# Patient Record
Sex: MALE | Race: BLACK OR AFRICAN AMERICAN | NOT HISPANIC OR LATINO | URBAN - METROPOLITAN AREA
[De-identification: names, ages, dates, MRNs, and addresses within clinical notes are randomized per-mention and may not be internally consistent; named-entity substitution may affect disease eponyms.]

---

## 2019-02-01 ENCOUNTER — OFFICE VISIT CONVERTED (OUTPATIENT)
Dept: FAMILY MEDICINE CLINIC | Facility: CLINIC | Age: 59
End: 2019-02-01
Attending: NURSE PRACTITIONER

## 2019-04-23 ENCOUNTER — HOSPITAL ENCOUNTER (OUTPATIENT)
Dept: GASTROENTEROLOGY | Facility: HOSPITAL | Age: 59
Setting detail: HOSPITAL OUTPATIENT SURGERY
Discharge: HOME | End: 2019-04-23
Attending: INTERNAL MEDICINE

## 2019-04-23 LAB — GLUCOSE BLD-MCNC: 121 MG/DL (ref 70–99)

## 2019-05-06 ENCOUNTER — OFFICE VISIT CONVERTED (OUTPATIENT)
Dept: FAMILY MEDICINE CLINIC | Facility: CLINIC | Age: 59
End: 2019-05-06
Attending: NURSE PRACTITIONER

## 2019-05-10 ENCOUNTER — HOSPITAL ENCOUNTER (OUTPATIENT)
Dept: OTHER | Facility: HOSPITAL | Age: 59
Discharge: HOME | End: 2019-05-10
Attending: NURSE PRACTITIONER

## 2019-05-10 LAB
ALBUMIN SERPL-MCNC: 4.9 G/DL (ref 3.5–5)
ALBUMIN/GLOB SERPL: 1.5 {RATIO} (ref 1.4–2.6)
ALP SERPL-CCNC: 91 U/L (ref 56–119)
ALT SERPL-CCNC: 99 U/L (ref 10–40)
ANION GAP SERPL CALC-SCNC: 20 MMOL/L (ref 8–19)
AST SERPL-CCNC: 81 U/L (ref 15–50)
BASOPHILS # BLD AUTO: 0.05 10*3/UL (ref 0–0.2)
BASOPHILS NFR BLD AUTO: 0.8 % (ref 0–3)
BILIRUB SERPL-MCNC: 0.47 MG/DL (ref 0.2–1.3)
BUN SERPL-MCNC: 13 MG/DL (ref 5–25)
BUN/CREAT SERPL: 11 {RATIO} (ref 6–20)
CALCIUM SERPL-MCNC: 10.1 MG/DL (ref 8.7–10.4)
CHLORIDE SERPL-SCNC: 98 MMOL/L (ref 99–111)
CHOLEST SERPL-MCNC: 113 MG/DL (ref 107–200)
CHOLEST/HDLC SERPL: 3.3 {RATIO} (ref 3–6)
CONV ABS IMM GRAN: 0.02 10*3/UL (ref 0–0.2)
CONV CO2: 24 MMOL/L (ref 22–32)
CONV CREATININE URINE, RANDOM: 122.6 MG/DL (ref 10–300)
CONV IMMATURE GRAN: 0.3 % (ref 0–1.8)
CONV MICROALBUM.,U,RANDOM: 56 MG/L (ref 0–20)
CONV TOTAL PROTEIN: 8.1 G/DL (ref 6.3–8.2)
CREAT UR-MCNC: 1.15 MG/DL (ref 0.7–1.2)
DEPRECATED RDW RBC AUTO: 45.7 FL (ref 35.1–43.9)
EOSINOPHIL # BLD AUTO: 0.33 10*3/UL (ref 0–0.7)
EOSINOPHIL # BLD AUTO: 5 % (ref 0–7)
ERYTHROCYTE [DISTWIDTH] IN BLOOD BY AUTOMATED COUNT: 13.2 % (ref 11.6–14.4)
EST. AVERAGE GLUCOSE BLD GHB EST-MCNC: 160 MG/DL
GFR SERPLBLD BASED ON 1.73 SQ M-ARVRAT: >60 ML/MIN/{1.73_M2}
GLOBULIN UR ELPH-MCNC: 3.2 G/DL (ref 2–3.5)
GLUCOSE SERPL-MCNC: 170 MG/DL (ref 70–99)
HBA1C MFR BLD: 12.4 G/DL (ref 14–18)
HBA1C MFR BLD: 7.2 % (ref 3.5–5.7)
HCT VFR BLD AUTO: 40.9 % (ref 42–52)
HDLC SERPL-MCNC: 34 MG/DL (ref 40–60)
LDLC SERPL CALC-MCNC: 58 MG/DL (ref 70–100)
LITHIUM SERPL-SCNC: 0.5 MEQ/L (ref 0.5–1.5)
LYMPHOCYTES # BLD AUTO: 1.73 10*3/UL (ref 1–5)
MCH RBC QN AUTO: 28.9 PG (ref 27–31)
MCHC RBC AUTO-ENTMCNC: 30.3 G/DL (ref 33–37)
MCV RBC AUTO: 95.3 FL (ref 80–96)
MICROALBUMIN/CREAT UR: 45.7 MG/G{CRE} (ref 0–25)
MONOCYTES # BLD AUTO: 0.57 10*3/UL (ref 0.2–1.2)
MONOCYTES NFR BLD AUTO: 8.7 % (ref 3–10)
NEUTROPHILS # BLD AUTO: 3.84 10*3/UL (ref 2–8)
NEUTROPHILS NFR BLD AUTO: 58.7 % (ref 30–85)
NRBC CBCN: 0 % (ref 0–0.7)
OSMOLALITY SERPL CALC.SUM OF ELEC: 290 MOSM/KG (ref 273–304)
PLATELET # BLD AUTO: 226 10*3/UL (ref 130–400)
PMV BLD AUTO: 9.8 FL (ref 9.4–12.4)
POTASSIUM SERPL-SCNC: 4.2 MMOL/L (ref 3.5–5.3)
RBC # BLD AUTO: 4.29 10*6/UL (ref 4.7–6.1)
SODIUM SERPL-SCNC: 138 MMOL/L (ref 135–147)
T4 FREE SERPL-MCNC: 1.3 NG/DL (ref 0.9–1.8)
TRIGL SERPL-MCNC: 106 MG/DL (ref 40–150)
TSH SERPL-ACNC: 1.92 M[IU]/L (ref 0.27–4.2)
VARIANT LYMPHS NFR BLD MANUAL: 26.5 % (ref 20–45)
VLDLC SERPL-MCNC: 21 MG/DL (ref 5–37)
WBC # BLD AUTO: 6.54 10*3/UL (ref 4.8–10.8)

## 2019-05-17 ENCOUNTER — HOSPITAL ENCOUNTER (OUTPATIENT)
Dept: FAMILY MEDICINE CLINIC | Facility: CLINIC | Age: 59
Discharge: HOME | End: 2019-05-17
Attending: NURSE PRACTITIONER

## 2019-05-17 LAB
FOLATE SERPL-MCNC: >20 NG/ML (ref 4.8–20)
VIT B12 SERPL-MCNC: >2000 PG/ML (ref 211–911)

## 2019-05-18 LAB
IRON SATN MFR SERPL: 18 % (ref 20–55)
IRON SERPL-MCNC: 90 UG/DL (ref 70–180)
TIBC SERPL-MCNC: 488 UG/DL (ref 245–450)
TRANSFERRIN SERPL-MCNC: 341 MG/DL (ref 215–365)

## 2019-05-19 LAB
CONV HEPATITIS B SURFACE AG W CONFIRMATION RE: NEGATIVE
HBV CORE IGM SERPL QL IA: NEGATIVE
HBV E AG SERPL QL IA: NEGATIVE
HBV SURFACE AB SER QL: REACTIVE

## 2019-05-21 LAB — HBV E AB SERPL QL IA: POSITIVE

## 2019-07-15 ENCOUNTER — HOSPITAL ENCOUNTER (OUTPATIENT)
Dept: FAMILY MEDICINE CLINIC | Facility: CLINIC | Age: 59
Discharge: HOME | End: 2019-07-15
Attending: NURSE PRACTITIONER

## 2019-07-15 LAB
25(OH)D3 SERPL-MCNC: 37.4 NG/ML (ref 30–100)
ALBUMIN SERPL-MCNC: 4.7 G/DL (ref 3.5–5)
ALBUMIN/GLOB SERPL: 1.7 {RATIO} (ref 1.4–2.6)
ALP SERPL-CCNC: 89 U/L (ref 56–119)
ALT SERPL-CCNC: 92 U/L (ref 10–40)
ANION GAP SERPL CALC-SCNC: 23 MMOL/L (ref 8–19)
AST SERPL-CCNC: 55 U/L (ref 15–50)
BASOPHILS # BLD AUTO: 0.04 10*3/UL (ref 0–0.2)
BASOPHILS NFR BLD AUTO: 0.6 % (ref 0–3)
BILIRUB SERPL-MCNC: 0.61 MG/DL (ref 0.2–1.3)
BUN SERPL-MCNC: 10 MG/DL (ref 5–25)
BUN/CREAT SERPL: 9 {RATIO} (ref 6–20)
CALCIUM SERPL-MCNC: 9.7 MG/DL (ref 8.7–10.4)
CHLORIDE SERPL-SCNC: 100 MMOL/L (ref 99–111)
CHOLEST SERPL-MCNC: 115 MG/DL (ref 107–200)
CHOLEST/HDLC SERPL: 3.1 {RATIO} (ref 3–6)
CONV ABS IMM GRAN: 0.01 10*3/UL (ref 0–0.2)
CONV CO2: 21 MMOL/L (ref 22–32)
CONV IMMATURE GRAN: 0.2 % (ref 0–1.8)
CONV TOTAL PROTEIN: 7.4 G/DL (ref 6.3–8.2)
CREAT UR-MCNC: 1.11 MG/DL (ref 0.7–1.2)
DEPRECATED RDW RBC AUTO: 47.8 FL (ref 35.1–43.9)
EOSINOPHIL # BLD AUTO: 0.3 10*3/UL (ref 0–0.7)
EOSINOPHIL # BLD AUTO: 4.8 % (ref 0–7)
ERYTHROCYTE [DISTWIDTH] IN BLOOD BY AUTOMATED COUNT: 13.7 % (ref 11.6–14.4)
EST. AVERAGE GLUCOSE BLD GHB EST-MCNC: 163 MG/DL
GFR SERPLBLD BASED ON 1.73 SQ M-ARVRAT: >60 ML/MIN/{1.73_M2}
GLOBULIN UR ELPH-MCNC: 2.7 G/DL (ref 2–3.5)
GLUCOSE SERPL-MCNC: 167 MG/DL (ref 70–99)
HBA1C MFR BLD: 12 G/DL (ref 14–18)
HBA1C MFR BLD: 7.3 % (ref 3.5–5.7)
HCT VFR BLD AUTO: 39.1 % (ref 42–52)
HDLC SERPL-MCNC: 37 MG/DL (ref 40–60)
LDLC SERPL CALC-MCNC: 58 MG/DL (ref 70–100)
LYMPHOCYTES # BLD AUTO: 2.05 10*3/UL (ref 1–5)
MCH RBC QN AUTO: 29.1 PG (ref 27–31)
MCHC RBC AUTO-ENTMCNC: 30.7 G/DL (ref 33–37)
MCV RBC AUTO: 94.9 FL (ref 80–96)
MONOCYTES # BLD AUTO: 0.52 10*3/UL (ref 0.2–1.2)
MONOCYTES NFR BLD AUTO: 8.3 % (ref 3–10)
NEUTROPHILS # BLD AUTO: 3.33 10*3/UL (ref 2–8)
NEUTROPHILS NFR BLD AUTO: 53.3 % (ref 30–85)
NRBC CBCN: 0 % (ref 0–0.7)
OSMOLALITY SERPL CALC.SUM OF ELEC: 293 MOSM/KG (ref 273–304)
PLATELET # BLD AUTO: 190 10*3/UL (ref 130–400)
PMV BLD AUTO: 10.6 FL (ref 9.4–12.4)
POTASSIUM SERPL-SCNC: 3.9 MMOL/L (ref 3.5–5.3)
PSA SERPL-MCNC: 0.52 NG/ML (ref 0–4)
RBC # BLD AUTO: 4.12 10*6/UL (ref 4.7–6.1)
SODIUM SERPL-SCNC: 140 MMOL/L (ref 135–147)
TRIGL SERPL-MCNC: 98 MG/DL (ref 40–150)
VARIANT LYMPHS NFR BLD MANUAL: 32.8 % (ref 20–45)
VLDLC SERPL-MCNC: 20 MG/DL (ref 5–37)
WBC # BLD AUTO: 6.25 10*3/UL (ref 4.8–10.8)

## 2019-07-16 LAB — TSH SERPL-ACNC: 2.59 M[IU]/L (ref 0.27–4.2)

## 2019-07-22 ENCOUNTER — HOSPITAL ENCOUNTER (OUTPATIENT)
Dept: FAMILY MEDICINE CLINIC | Facility: CLINIC | Age: 59
Discharge: HOME | End: 2019-07-22
Attending: NURSE PRACTITIONER

## 2019-07-22 ENCOUNTER — OFFICE VISIT CONVERTED (OUTPATIENT)
Dept: FAMILY MEDICINE CLINIC | Facility: CLINIC | Age: 59
End: 2019-07-22
Attending: NURSE PRACTITIONER

## 2019-07-22 ENCOUNTER — CONVERSION ENCOUNTER (OUTPATIENT)
Dept: FAMILY MEDICINE CLINIC | Facility: CLINIC | Age: 59
End: 2019-07-22

## 2019-07-22 LAB
BASOPHILS # BLD AUTO: 0.05 10*3/UL (ref 0–0.2)
BASOPHILS NFR BLD AUTO: 0.7 % (ref 0–3)
CONV ABS IMM GRAN: 0.02 10*3/UL (ref 0–0.2)
CONV IMMATURE GRAN: 0.3 % (ref 0–1.8)
DEPRECATED RDW RBC AUTO: 49.5 FL (ref 35.1–43.9)
EOSINOPHIL # BLD AUTO: 0.29 10*3/UL (ref 0–0.7)
EOSINOPHIL # BLD AUTO: 4.3 % (ref 0–7)
ERYTHROCYTE [DISTWIDTH] IN BLOOD BY AUTOMATED COUNT: 14 % (ref 11.6–14.4)
FOLATE SERPL-MCNC: >20 NG/ML (ref 4.8–20)
HBA1C MFR BLD: 12.3 G/DL (ref 14–18)
HCT VFR BLD AUTO: 40.8 % (ref 42–52)
IRON SATN MFR SERPL: 16 % (ref 20–55)
IRON SERPL-MCNC: 83 UG/DL (ref 70–180)
LYMPHOCYTES # BLD AUTO: 1.92 10*3/UL (ref 1–5)
MCH RBC QN AUTO: 29.1 PG (ref 27–31)
MCHC RBC AUTO-ENTMCNC: 30.1 G/DL (ref 33–37)
MCV RBC AUTO: 96.5 FL (ref 80–96)
MONOCYTES # BLD AUTO: 0.51 10*3/UL (ref 0.2–1.2)
MONOCYTES NFR BLD AUTO: 7.6 % (ref 3–10)
NEUTROPHILS # BLD AUTO: 3.94 10*3/UL (ref 2–8)
NEUTROPHILS NFR BLD AUTO: 58.6 % (ref 30–85)
NRBC CBCN: 0 % (ref 0–0.7)
PLATELET # BLD AUTO: 202 10*3/UL (ref 130–400)
PMV BLD AUTO: 11 FL (ref 9.4–12.4)
RBC # BLD AUTO: 4.23 10*6/UL (ref 4.7–6.1)
TIBC SERPL-MCNC: 505 UG/DL (ref 245–450)
TRANSFERRIN SERPL-MCNC: 353 MG/DL (ref 215–365)
VARIANT LYMPHS NFR BLD MANUAL: 28.5 % (ref 20–45)
VIT B12 SERPL-MCNC: 1728 PG/ML (ref 211–911)
WBC # BLD AUTO: 6.73 10*3/UL (ref 4.8–10.8)

## 2019-10-02 ENCOUNTER — OFFICE VISIT CONVERTED (OUTPATIENT)
Dept: FAMILY MEDICINE CLINIC | Facility: CLINIC | Age: 59
End: 2019-10-02
Attending: NURSE PRACTITIONER

## 2019-11-08 ENCOUNTER — OFFICE VISIT CONVERTED (OUTPATIENT)
Dept: FAMILY MEDICINE CLINIC | Facility: CLINIC | Age: 59
End: 2019-11-08
Attending: NURSE PRACTITIONER

## 2019-11-08 ENCOUNTER — HOSPITAL ENCOUNTER (OUTPATIENT)
Dept: FAMILY MEDICINE CLINIC | Facility: CLINIC | Age: 59
Discharge: HOME | End: 2019-11-08
Attending: NURSE PRACTITIONER

## 2019-11-08 LAB
BASOPHILS # BLD AUTO: 0.05 10*3/UL (ref 0–0.2)
BASOPHILS NFR BLD AUTO: 0.7 % (ref 0–3)
CONV ABS IMM GRAN: 0.02 10*3/UL (ref 0–0.2)
CONV IMMATURE GRAN: 0.3 % (ref 0–1.8)
DEPRECATED RDW RBC AUTO: 48.9 FL (ref 35.1–43.9)
EOSINOPHIL # BLD AUTO: 0.38 10*3/UL (ref 0–0.7)
EOSINOPHIL # BLD AUTO: 5.1 % (ref 0–7)
ERYTHROCYTE [DISTWIDTH] IN BLOOD BY AUTOMATED COUNT: 13.7 % (ref 11.6–14.4)
EST. AVERAGE GLUCOSE BLD GHB EST-MCNC: 146 MG/DL
HBA1C MFR BLD: 6.7 % (ref 3.5–5.7)
HCT VFR BLD AUTO: 41.9 % (ref 42–52)
HGB BLD-MCNC: 13 G/DL (ref 14–18)
LYMPHOCYTES # BLD AUTO: 2.12 10*3/UL (ref 1–5)
LYMPHOCYTES NFR BLD AUTO: 28.4 % (ref 20–45)
MCH RBC QN AUTO: 30 PG (ref 27–31)
MCHC RBC AUTO-ENTMCNC: 31 G/DL (ref 33–37)
MCV RBC AUTO: 96.5 FL (ref 80–96)
MONOCYTES # BLD AUTO: 0.56 10*3/UL (ref 0.2–1.2)
MONOCYTES NFR BLD AUTO: 7.5 % (ref 3–10)
NEUTROPHILS # BLD AUTO: 4.34 10*3/UL (ref 2–8)
NEUTROPHILS NFR BLD AUTO: 58 % (ref 30–85)
NRBC CBCN: 0 % (ref 0–0.7)
PLATELET # BLD AUTO: 192 10*3/UL (ref 130–400)
PMV BLD AUTO: 11.1 FL (ref 9.4–12.4)
RBC # BLD AUTO: 4.34 10*6/UL (ref 4.7–6.1)
WBC # BLD AUTO: 7.47 10*3/UL (ref 4.8–10.8)

## 2019-11-09 LAB
ALBUMIN SERPL-MCNC: 5 G/DL (ref 3.5–5)
ALBUMIN/GLOB SERPL: 1.9 {RATIO} (ref 1.4–2.6)
ALP SERPL-CCNC: 69 U/L (ref 56–119)
ALT SERPL-CCNC: 84 U/L (ref 10–40)
ANION GAP SERPL CALC-SCNC: 21 MMOL/L (ref 8–19)
AST SERPL-CCNC: 54 U/L (ref 15–50)
BILIRUB SERPL-MCNC: 0.57 MG/DL (ref 0.2–1.3)
BUN SERPL-MCNC: 11 MG/DL (ref 5–25)
BUN/CREAT SERPL: 10 {RATIO} (ref 6–20)
CALCIUM SERPL-MCNC: 10.5 MG/DL (ref 8.7–10.4)
CHLORIDE SERPL-SCNC: 99 MMOL/L (ref 99–111)
CONV CO2: 23 MMOL/L (ref 22–32)
CONV TOTAL PROTEIN: 7.6 G/DL (ref 6.3–8.2)
CREAT UR-MCNC: 1.08 MG/DL (ref 0.7–1.2)
GFR SERPLBLD BASED ON 1.73 SQ M-ARVRAT: >60 ML/MIN/{1.73_M2}
GLOBULIN UR ELPH-MCNC: 2.6 G/DL (ref 2–3.5)
GLUCOSE SERPL-MCNC: 123 MG/DL (ref 70–99)
IRON SATN MFR SERPL: 22 % (ref 20–55)
IRON SERPL-MCNC: 105 UG/DL (ref 70–180)
LITHIUM SERPL-SCNC: 0.6 MEQ/L (ref 0.5–1.5)
OSMOLALITY SERPL CALC.SUM OF ELEC: 289 MOSM/KG (ref 273–304)
POTASSIUM SERPL-SCNC: 4.4 MMOL/L (ref 3.5–5.3)
SODIUM SERPL-SCNC: 139 MMOL/L (ref 135–147)
T4 FREE SERPL-MCNC: 1.2 NG/DL (ref 0.9–1.8)
TIBC SERPL-MCNC: 476 UG/DL (ref 245–450)
TRANSFERRIN SERPL-MCNC: 333 MG/DL (ref 215–365)
TSH SERPL-ACNC: 1.99 M[IU]/L (ref 0.27–4.2)

## 2020-01-15 ENCOUNTER — HOSPITAL ENCOUNTER (OUTPATIENT)
Dept: FAMILY MEDICINE CLINIC | Facility: CLINIC | Age: 60
Discharge: HOME | End: 2020-01-15
Attending: NURSE PRACTITIONER

## 2020-01-15 ENCOUNTER — CONVERSION ENCOUNTER (OUTPATIENT)
Dept: FAMILY MEDICINE CLINIC | Facility: CLINIC | Age: 60
End: 2020-01-15

## 2020-01-15 ENCOUNTER — OFFICE VISIT CONVERTED (OUTPATIENT)
Dept: FAMILY MEDICINE CLINIC | Facility: CLINIC | Age: 60
End: 2020-01-15
Attending: NURSE PRACTITIONER

## 2020-01-15 LAB
ALBUMIN SERPL-MCNC: 4.8 G/DL (ref 3.5–5)
ALBUMIN/GLOB SERPL: 1.8 {RATIO} (ref 1.4–2.6)
ALP SERPL-CCNC: 68 U/L (ref 56–119)
ALT SERPL-CCNC: 66 U/L (ref 10–40)
ANION GAP SERPL CALC-SCNC: 18 MMOL/L (ref 8–19)
AST SERPL-CCNC: 43 U/L (ref 15–50)
BASOPHILS # BLD AUTO: 0.05 10*3/UL (ref 0–0.2)
BASOPHILS NFR BLD AUTO: 0.6 % (ref 0–3)
BILIRUB SERPL-MCNC: 0.68 MG/DL (ref 0.2–1.3)
BUN SERPL-MCNC: 14 MG/DL (ref 5–25)
BUN/CREAT SERPL: 12 {RATIO} (ref 6–20)
CALCIUM SERPL-MCNC: 10.2 MG/DL (ref 8.7–10.4)
CHLORIDE SERPL-SCNC: 104 MMOL/L (ref 99–111)
CHOLEST SERPL-MCNC: 112 MG/DL (ref 107–200)
CHOLEST/HDLC SERPL: 3.6 {RATIO} (ref 3–6)
CONV ABS IMM GRAN: 0.02 10*3/UL (ref 0–0.2)
CONV CO2: 23 MMOL/L (ref 22–32)
CONV IMMATURE GRAN: 0.2 % (ref 0–1.8)
CONV TOTAL PROTEIN: 7.4 G/DL (ref 6.3–8.2)
CREAT UR-MCNC: 1.13 MG/DL (ref 0.7–1.2)
DEPRECATED RDW RBC AUTO: 49.5 FL (ref 35.1–43.9)
EOSINOPHIL # BLD AUTO: 0.37 10*3/UL (ref 0–0.7)
EOSINOPHIL # BLD AUTO: 4.6 % (ref 0–7)
ERYTHROCYTE [DISTWIDTH] IN BLOOD BY AUTOMATED COUNT: 13.8 % (ref 11.6–14.4)
EST. AVERAGE GLUCOSE BLD GHB EST-MCNC: 143 MG/DL
FOLATE SERPL-MCNC: >20 NG/ML (ref 4.8–20)
GFR SERPLBLD BASED ON 1.73 SQ M-ARVRAT: >60 ML/MIN/{1.73_M2}
GLOBULIN UR ELPH-MCNC: 2.6 G/DL (ref 2–3.5)
GLUCOSE SERPL-MCNC: 110 MG/DL (ref 70–99)
HBA1C MFR BLD: 6.6 % (ref 3.5–5.7)
HCT VFR BLD AUTO: 40.3 % (ref 42–52)
HDLC SERPL-MCNC: 31 MG/DL (ref 40–60)
HGB BLD-MCNC: 12.6 G/DL (ref 14–18)
LDLC SERPL CALC-MCNC: 62 MG/DL (ref 70–100)
LYMPHOCYTES # BLD AUTO: 2.6 10*3/UL (ref 1–5)
LYMPHOCYTES NFR BLD AUTO: 32.5 % (ref 20–45)
MCH RBC QN AUTO: 30.1 PG (ref 27–31)
MCHC RBC AUTO-ENTMCNC: 31.3 G/DL (ref 33–37)
MCV RBC AUTO: 96.4 FL (ref 80–96)
MONOCYTES # BLD AUTO: 0.62 10*3/UL (ref 0.2–1.2)
MONOCYTES NFR BLD AUTO: 7.7 % (ref 3–10)
NEUTROPHILS # BLD AUTO: 4.35 10*3/UL (ref 2–8)
NEUTROPHILS NFR BLD AUTO: 54.4 % (ref 30–85)
NRBC CBCN: 0 % (ref 0–0.7)
OSMOLALITY SERPL CALC.SUM OF ELEC: 291 MOSM/KG (ref 273–304)
PLATELET # BLD AUTO: 235 10*3/UL (ref 130–400)
PMV BLD AUTO: 10.6 FL (ref 9.4–12.4)
POTASSIUM SERPL-SCNC: 4.6 MMOL/L (ref 3.5–5.3)
RBC # BLD AUTO: 4.18 10*6/UL (ref 4.7–6.1)
SODIUM SERPL-SCNC: 140 MMOL/L (ref 135–147)
T4 FREE SERPL-MCNC: 1.2 NG/DL (ref 0.9–1.8)
TRIGL SERPL-MCNC: 97 MG/DL (ref 40–150)
TSH SERPL-ACNC: 1.6 M[IU]/L (ref 0.27–4.2)
VIT B12 SERPL-MCNC: >2000 PG/ML (ref 211–911)
VLDLC SERPL-MCNC: 19 MG/DL (ref 5–37)
WBC # BLD AUTO: 8.01 10*3/UL (ref 4.8–10.8)

## 2020-01-16 LAB
FERRITIN SERPL-MCNC: 145 NG/ML (ref 30–300)
IRON SATN MFR SERPL: 24 % (ref 20–55)
IRON SERPL-MCNC: 101 UG/DL (ref 70–180)
TIBC SERPL-MCNC: 429 UG/DL (ref 245–450)
TRANSFERRIN SERPL-MCNC: 300 MG/DL (ref 215–365)

## 2020-01-17 ENCOUNTER — OFFICE VISIT CONVERTED (OUTPATIENT)
Dept: GASTROENTEROLOGY | Facility: CLINIC | Age: 60
End: 2020-01-17
Attending: NURSE PRACTITIONER

## 2020-01-22 ENCOUNTER — HOSPITAL ENCOUNTER (OUTPATIENT)
Dept: GENERAL RADIOLOGY | Facility: HOSPITAL | Age: 60
Discharge: HOME | End: 2020-01-22
Attending: NURSE PRACTITIONER

## 2020-01-23 ENCOUNTER — OFFICE VISIT CONVERTED (OUTPATIENT)
Dept: ORTHOPEDIC SURGERY | Facility: CLINIC | Age: 60
End: 2020-01-23
Attending: ORTHOPAEDIC SURGERY

## 2020-01-29 ENCOUNTER — HOSPITAL ENCOUNTER (OUTPATIENT)
Dept: DIABETES SERVICES | Facility: HOSPITAL | Age: 60
Setting detail: RECURRING SERIES
Discharge: HOME | End: 2020-01-31
Attending: NURSE PRACTITIONER

## 2020-03-06 ENCOUNTER — HOSPITAL ENCOUNTER (OUTPATIENT)
Dept: GASTROENTEROLOGY | Facility: HOSPITAL | Age: 60
Setting detail: HOSPITAL OUTPATIENT SURGERY
Discharge: HOME | End: 2020-03-06
Attending: INTERNAL MEDICINE

## 2020-03-06 LAB — GLUCOSE BLD-MCNC: 200 MG/DL (ref 70–99)

## 2020-03-23 ENCOUNTER — HOSPITAL ENCOUNTER (OUTPATIENT)
Dept: PREADMISSION TESTING | Facility: HOSPITAL | Age: 60
Discharge: HOME | End: 2020-03-23
Attending: ORTHOPAEDIC SURGERY

## 2020-04-03 ENCOUNTER — TELEMEDICINE CONVERTED (OUTPATIENT)
Dept: FAMILY MEDICINE CLINIC | Facility: CLINIC | Age: 60
End: 2020-04-03
Attending: NURSE PRACTITIONER

## 2020-06-11 ENCOUNTER — HOSPITAL ENCOUNTER (OUTPATIENT)
Dept: NUCLEAR MEDICINE | Facility: HOSPITAL | Age: 60
Discharge: HOME | End: 2020-06-11
Attending: SPECIALIST

## 2020-06-22 ENCOUNTER — HOSPITAL ENCOUNTER (OUTPATIENT)
Dept: PREADMISSION TESTING | Facility: HOSPITAL | Age: 60
Discharge: HOME | End: 2020-06-22
Attending: ORTHOPAEDIC SURGERY

## 2020-06-24 LAB — SARS-COV-2 RNA SPEC QL NAA+PROBE: NOT DETECTED

## 2020-07-02 ENCOUNTER — TELEMEDICINE CONVERTED (OUTPATIENT)
Dept: FAMILY MEDICINE CLINIC | Facility: CLINIC | Age: 60
End: 2020-07-02
Attending: NURSE PRACTITIONER

## 2020-07-09 ENCOUNTER — CONVERSION ENCOUNTER (OUTPATIENT)
Dept: ORTHOPEDIC SURGERY | Facility: CLINIC | Age: 60
End: 2020-07-09

## 2020-07-09 ENCOUNTER — OFFICE VISIT CONVERTED (OUTPATIENT)
Dept: ORTHOPEDIC SURGERY | Facility: CLINIC | Age: 60
End: 2020-07-09
Attending: ORTHOPAEDIC SURGERY

## 2020-08-13 ENCOUNTER — OFFICE VISIT CONVERTED (OUTPATIENT)
Dept: ORTHOPEDIC SURGERY | Facility: CLINIC | Age: 60
End: 2020-08-13
Attending: PHYSICIAN ASSISTANT

## 2020-08-13 ENCOUNTER — CONVERSION ENCOUNTER (OUTPATIENT)
Dept: ORTHOPEDIC SURGERY | Facility: CLINIC | Age: 60
End: 2020-08-13

## 2020-08-13 ENCOUNTER — HOSPITAL ENCOUNTER (OUTPATIENT)
Dept: PREADMISSION TESTING | Facility: HOSPITAL | Age: 60
Discharge: HOME | End: 2020-08-13
Attending: ORTHOPAEDIC SURGERY

## 2020-08-14 LAB — SARS-COV-2 RNA SPEC QL NAA+PROBE: NOT DETECTED

## 2020-08-17 ENCOUNTER — HOSPITAL ENCOUNTER (OUTPATIENT)
Dept: PERIOP | Facility: HOSPITAL | Age: 60
Setting detail: HOSPITAL OUTPATIENT SURGERY
Discharge: HOME | End: 2020-08-18
Attending: INTERNAL MEDICINE

## 2020-08-17 LAB
GLUCOSE BLD-MCNC: 140 MG/DL (ref 70–99)
GLUCOSE BLD-MCNC: 140 MG/DL (ref 70–99)
GLUCOSE BLD-MCNC: 143 MG/DL (ref 70–99)

## 2020-08-18 LAB
ANION GAP SERPL CALC-SCNC: 16 MMOL/L (ref 8–19)
BUN SERPL-MCNC: 13 MG/DL (ref 5–25)
BUN/CREAT SERPL: 13 {RATIO} (ref 6–20)
CALCIUM SERPL-MCNC: 10.1 MG/DL (ref 8.7–10.4)
CHLORIDE SERPL-SCNC: 102 MMOL/L (ref 99–111)
CONV CO2: 24 MMOL/L (ref 22–32)
CREAT UR-MCNC: 1.04 MG/DL (ref 0.7–1.2)
GFR SERPLBLD BASED ON 1.73 SQ M-ARVRAT: >60 ML/MIN/{1.73_M2}
GLUCOSE BLD-MCNC: 130 MG/DL (ref 70–99)
GLUCOSE BLD-MCNC: 154 MG/DL (ref 70–99)
GLUCOSE SERPL-MCNC: 179 MG/DL (ref 70–99)
OSMOLALITY SERPL CALC.SUM OF ELEC: 291 MOSM/KG (ref 273–304)
POTASSIUM SERPL-SCNC: 4.4 MMOL/L (ref 3.5–5.3)
SODIUM SERPL-SCNC: 138 MMOL/L (ref 135–147)

## 2020-08-31 ENCOUNTER — OFFICE VISIT CONVERTED (OUTPATIENT)
Dept: ORTHOPEDIC SURGERY | Facility: CLINIC | Age: 60
End: 2020-08-31
Attending: PHYSICIAN ASSISTANT

## 2020-10-01 ENCOUNTER — OFFICE VISIT CONVERTED (OUTPATIENT)
Dept: ORTHOPEDIC SURGERY | Facility: CLINIC | Age: 60
End: 2020-10-01
Attending: PHYSICIAN ASSISTANT

## 2020-10-09 ENCOUNTER — HOSPITAL ENCOUNTER (OUTPATIENT)
Dept: FAMILY MEDICINE CLINIC | Facility: CLINIC | Age: 60
Discharge: HOME | End: 2020-10-09
Attending: NURSE PRACTITIONER

## 2020-10-09 LAB
25(OH)D3 SERPL-MCNC: 44.7 NG/ML (ref 30–100)
ALBUMIN SERPL-MCNC: 5 G/DL (ref 3.5–5)
ALBUMIN/GLOB SERPL: 1.9 {RATIO} (ref 1.4–2.6)
ALP SERPL-CCNC: 88 U/L (ref 56–155)
ALT SERPL-CCNC: 70 U/L (ref 10–40)
ANION GAP SERPL CALC-SCNC: 19 MMOL/L (ref 8–19)
AST SERPL-CCNC: 48 U/L (ref 15–50)
BASOPHILS # BLD AUTO: 0.06 10*3/UL (ref 0–0.2)
BASOPHILS NFR BLD AUTO: 0.8 % (ref 0–3)
BILIRUB SERPL-MCNC: 0.49 MG/DL (ref 0.2–1.3)
BUN SERPL-MCNC: 12 MG/DL (ref 5–25)
BUN/CREAT SERPL: 11 {RATIO} (ref 6–20)
CALCIUM SERPL-MCNC: 10.4 MG/DL (ref 8.7–10.4)
CHLORIDE SERPL-SCNC: 99 MMOL/L (ref 99–111)
CHOLEST SERPL-MCNC: 133 MG/DL (ref 107–200)
CHOLEST/HDLC SERPL: 3.4 {RATIO} (ref 3–6)
CONV ABS IMM GRAN: 0.01 10*3/UL (ref 0–0.2)
CONV CO2: 21 MMOL/L (ref 22–32)
CONV CREATININE URINE, RANDOM: 170.4 MG/DL (ref 10–300)
CONV IMMATURE GRAN: 0.1 % (ref 0–1.8)
CONV MICROALBUM.,U,RANDOM: 86.7 MG/L (ref 0–20)
CONV TOTAL PROTEIN: 7.7 G/DL (ref 6.3–8.2)
CREAT UR-MCNC: 1.07 MG/DL (ref 0.7–1.2)
DEPRECATED RDW RBC AUTO: 45.9 FL (ref 35.1–43.9)
EOSINOPHIL # BLD AUTO: 0.44 10*3/UL (ref 0–0.7)
EOSINOPHIL # BLD AUTO: 5.7 % (ref 0–7)
ERYTHROCYTE [DISTWIDTH] IN BLOOD BY AUTOMATED COUNT: 13.3 % (ref 11.6–14.4)
EST. AVERAGE GLUCOSE BLD GHB EST-MCNC: 177 MG/DL
GFR SERPLBLD BASED ON 1.73 SQ M-ARVRAT: >60 ML/MIN/{1.73_M2}
GLOBULIN UR ELPH-MCNC: 2.7 G/DL (ref 2–3.5)
GLUCOSE SERPL-MCNC: 134 MG/DL (ref 70–99)
HBA1C MFR BLD: 7.8 % (ref 3.5–5.7)
HCT VFR BLD AUTO: 44 % (ref 42–52)
HDLC SERPL-MCNC: 39 MG/DL (ref 40–60)
HGB BLD-MCNC: 13.6 G/DL (ref 14–18)
LDLC SERPL CALC-MCNC: 63 MG/DL (ref 70–100)
LYMPHOCYTES # BLD AUTO: 2.63 10*3/UL (ref 1–5)
LYMPHOCYTES NFR BLD AUTO: 34.1 % (ref 20–45)
MCH RBC QN AUTO: 29 PG (ref 27–31)
MCHC RBC AUTO-ENTMCNC: 30.9 G/DL (ref 33–37)
MCV RBC AUTO: 93.8 FL (ref 80–96)
MICROALBUMIN/CREAT UR: 50.9 MG/G{CRE} (ref 0–25)
MONOCYTES # BLD AUTO: 0.7 10*3/UL (ref 0.2–1.2)
MONOCYTES NFR BLD AUTO: 9.1 % (ref 3–10)
NEUTROPHILS # BLD AUTO: 3.88 10*3/UL (ref 2–8)
NEUTROPHILS NFR BLD AUTO: 50.2 % (ref 30–85)
NRBC CBCN: 0 % (ref 0–0.7)
OSMOLALITY SERPL CALC.SUM OF ELEC: 282 MOSM/KG (ref 273–304)
PLATELET # BLD AUTO: 201 10*3/UL (ref 130–400)
PMV BLD AUTO: 10.9 FL (ref 9.4–12.4)
POTASSIUM SERPL-SCNC: 4.1 MMOL/L (ref 3.5–5.3)
PSA SERPL-MCNC: 0.35 NG/ML (ref 0–4)
RBC # BLD AUTO: 4.69 10*6/UL (ref 4.7–6.1)
SODIUM SERPL-SCNC: 135 MMOL/L (ref 135–147)
TRIGL SERPL-MCNC: 155 MG/DL (ref 40–150)
TSH SERPL-ACNC: 2.76 M[IU]/L (ref 0.27–4.2)
VLDLC SERPL-MCNC: 31 MG/DL (ref 5–37)
WBC # BLD AUTO: 7.72 10*3/UL (ref 4.8–10.8)

## 2021-05-12 NOTE — PROGRESS NOTES
Quick Note      Patient Name: Manuel Subramanian   Patient ID: [id number]   Sex: Male   YOB: 1960    Primary Care Provider: Cassidy MATHIAS   Referring Provider: Cassidy MATHIAS    Visit Date: April 3, 2020    Provider: MEY Ash   Location: [facility]   Location Address: [address] [place] [address], [address] [place] [address]  [id number]   Location Phone: [phone]          History Of Present Illness  TELEHEALTH VISIT  Chief Complaint: Medication refills   Manuel Subramanian is a 59 year old /Black male who is presenting for evaluation via telehealth visit. Verbal consent obtained before beginning visit.   Provider spent 10 minutes with the patient during telehealth visit.   The following staff were present during this visit: braulio Rodas/ Cassidy Guan   Past Medical History/Overview of Patient Symptoms     Patient is a 59-year-old gentleman who has an appointment today via telehealth.  He has a previous history of COPD, diabetes.  He has a history of bipolar and insomnia.  He also has a history of iron deficiency anemia.  He is requiring refills on his nebs and inhaler.  His last hemoglobin A1c was 6.6.  He tries to watch his diet.  He is well controlled with medication.  He otherwise is feeling well without any complaints.           Assessment  · Anemia     285.9/D64.9  · COPD (chronic obstructive pulmonary disease)     496/J44.9  · Diabetes mellitus, type 2     250.00/E11.9  · Obesity     278.00/E66.9      Plan  · Medications  o albuterol sulfate 2.5 mg /3 mL (0.083 %) inhalation solution for nebulization   SIG: inhale 3 milliliters (2.5 mg) by nebulization route 3 times per day for 30 days   DISP: (3) Boxes with 4 refills  Prescribed on 04/03/2020     o Aspirin Low Dose 81 mg oral tablet,delayed release (DR/EC)   SIG: take 1 tablet (81 mg) by oral route once daily for 90 days   DISP: (90) tablet with 1 refills  Prescribed on 04/03/2020     o ferrous sulfate 325 mg (65  mg iron) oral tablet   SIG: take 1 tablet (325 mg) by oral route once daily for 90 days   DISP: (90) tablets with 1 refills  Adjusted on 04/03/2020     o fexofenadine 180 mg oral tablet   SIG: take 1 tablet (180 mg) by oral route once daily for 90 days   DISP: (90) tablet with 1 refills  Adjusted on 04/03/2020     o Janumet 50-1,000 mg oral tablet   SIG: take 1 tablet by oral route 2 times per day with meals for 90 days   DISP: (180) tablet with 1 refills  Adjusted on 04/03/2020     o melatonin 5 mg oral capsule   SIG: take 1 capsule by oral route daily for 90 days   DISP: (90) capsules with 1 refills  Adjusted on 04/03/2020     o montelukast 10 mg oral tablet   SIG: take 1 tablet (10 mg) by oral route once daily in the evening for 90 days   DISP: (90) tablet with 1 refills  Adjusted on 04/03/2020     o Ventolin HFA 90 mcg/actuation inhalation HFA aerosol inhaler   SIG: inhale 1 puff (90 mcg) by inhalation route every 6 hours as needed for 90 days   DISP: (3) 8 gm canister with 1 refills  Adjusted on 04/03/2020     · Instructions  o Continue blood sugar monitoring daily and record. Bring your log to office visits. Call the office for readings below 70 and above 250 or any complications.  o Daily foot care. Avoid walking barefoot. Annual Dilated Eye Exam.  o Discussed with patient blood pressure monitoring, hemoglobin A1C levels need to be below 7.0, and LDL (Lipid) goals below 70.  o Plan Of Care:   o Take all medications as prescribed/directed.  · Disposition  o Call or Return if symptoms worsen or persist.  o follow up as needed  o call the office with any questions or concerns  o Follow-up in 3 months            Electronically Signed by: Cassidy Guan APRN -Author on April 3, 2020 02:57:15 PM

## 2021-05-13 NOTE — PROGRESS NOTES
Quick Note      Patient Name: Manuel Subramanian   Patient ID: [id number]   Sex: Male   YOB: 1960    Primary Care Provider: Cassidy MATHIAS   Referring Provider: Cassidy MATHIAS    Visit Date: July 2, 2020    Provider: MEY Ash   Location: [facility]   Location Address: [address] [place] [address], [address] [place] [address]  [id number]   Location Phone: [phone]          History Of Present Illness  Video Conferencing Visit  Manuel Subramanian is a 59 year old /Black male who is presenting for evaluation via video conferencing via [employer] tele-video. Verbal consent obtained before beginning visit.   The following staff were present during this visit: Bethany Guan   TELEHEALTH TELEPHONE VISIT  Chief Complaint: IPF Right total knee replacement   Manuel Subramanian is a 59 year old /Black male who is presenting for evaluation via telehealth telephone visit. Verbal consent obtained before beginning visit.   Provider spent 20 minutes with patient during telehealth visit.   The following staff were present during this visit: Bethany Guan   Past Medical History/Overview of Patient Symptoms  FOLLOW UP OFFICE VISIT WITHIN 14 CALENDAR DAYS OF INPATIENT STATUS (MODERATE COMPLEXITY)  Manuel Subramanian presents to office for follow up post discharge from inpatient status within 14 calendar days. Patient was contacted within 2 business days via phone conversation. Documentation of that phone contact is present in the patient's electronic chart. Patient was admitted to inpatient facility on 06/25/2020 and discharged 06/26/2020 due to: Right total knee replacement.   Admitting MD: Jovany Cid   His discharge summary has been reviewed and placed in the patient's electronic chart.   Patient's problem list is: Allergies, Anxiety, Arthritis, Asthma, Bipolar Disorder, COPD, Depression, Diabetes, Hyperlipidemia, Hypertension, essential, Psychiatric  Care, Skin Disease, and Sleep apnea, unspecified   Patient's outpatient medication list has been reconcilled with the medication list from the discharge summary and has been reviewed with the patient. Current medication list is: albuterol sulfate 2.5 mg /3 mL (0.083 %) inhalation solution for nebulization, aspirin 325 mg oral tablet,delayed release (DR/EC), Aspirin Low Dose 81 mg oral tablet,delayed release (DR/EC), atorvastatin 20 mg oral tablet, Eliquis 2.5 mg oral tablet, ferrous sulfate 325 mg (65 mg iron) oral tablet, fexofenadine 180 mg oral tablet, fluoxetine 20 mg oral capsule, hydrochlorothiazide 12.5 mg oral capsule, Janumet 50-1,000 mg oral tablet, lidocaine 5 % topical adhesive patch,medicated, Linzess 145 mcg oral capsule, lisinopril 40 mg oral tablet, lithium carbonate 450 mg oral tablet extended release, melatonin 5 mg oral capsule, metoprolol succinate 100 mg oral tablet extended release 24 hr, montelukast 10 mg oral tablet, oxycodone-acetaminophen 7.5-325 mg oral tablet, Protonix 40 mg oral tablet,delayed release (DR/EC), Seroquel  mg oral tablet extended release 24 hr, Synthroid 75 mcg oral tablet, and Ventolin HFA 90 mcg/actuation inhalation HFA aerosol inhaler      Patient was seen via [employer] tele-video visit for IPF right total knee replacement.  He is currently doing physical therapy at home.  He states he is doing well, each day he feels like he is improving.  He has no complaints just needing medication refills at this time.  He is to follow-up with orthopedic surgeon July 9, 2020.  His pain is controlled, no significant concerns at this time.       Physical Examination  · Constitutional  o Appearance  o : no acute distress, well-nourished  · Respiratory  o Respiratory Effort  o : breathing comfortably, symmetric chest rise  · Neurologic  o Mental Status Examination  o :   § Orientation  § : grossly oriented to person, place and time  · Psychiatric  o General  o : normal mood and  affect          Assessment  · Hospital discharge follow-up     V67.59/Z09  · Total knee replacement status, left     V43.65/Z96.652  · Medication refill     V68.1/Z76.0    Problems Reconciled  Plan  · Orders  o ACO-39: Current medications updated and reviewed () - - 07/02/2020  o Discharge medications reconciled with the current medication list (1111F) - - 07/02/2020  · Medications  o albuterol sulfate 2.5 mg /3 mL (0.083 %) inhalation solution for nebulization   SIG: inhale 3 milliliters (2.5 mg) by nebulization route 3 times per day for 90 days   DISP: (6) Boxes with 1 refills  Adjusted on 07/02/2020     o Aspirin Low Dose 81 mg oral tablet,delayed release (DR/EC)   SIG: take 1 tablet (81 mg) by oral route once daily for 90 days   DISP: (90) tablet with 1 refills  Adjusted on 07/02/2020     o ferrous sulfate 325 mg (65 mg iron) oral tablet   SIG: take 1 tablet (325 mg) by oral route once daily for 90 days   DISP: (90) tablets with 1 refills  Adjusted on 07/02/2020     o fexofenadine 180 mg oral tablet   SIG: take 1 tablet (180 mg) by oral route once daily for 90 days   DISP: (90) tablet with 1 refills  Adjusted on 07/02/2020     o hydrochlorothiazide 12.5 mg oral capsule   SIG: take 1 capsule (12.5 mg) by oral route once daily for 90 days   DISP: (90) capsule with 1 refills  Adjusted on 07/02/2020     o Janumet 50-1,000 mg oral tablet   SIG: take 1 tablet by oral route 2 times per day with meals for 90 days   DISP: (180) tablet with 1 refills  Adjusted on 07/02/2020     o Linzess 145 mcg oral capsule   SIG: take 1 capsule by oral route daily for 90 days take 30 min before meal   DISP: (90) capsules with 1 refills  Adjusted on 07/02/2020     o melatonin 5 mg oral capsule   SIG: take 1 capsule by oral route daily for 90 days   DISP: (90) capsules with 1 refills  Adjusted on 07/02/2020     o montelukast 10 mg oral tablet   SIG: take 1 tablet (10 mg) by oral route once daily in the evening for 90 days   DISP:  (90) tablet with 1 refills  Adjusted on 07/02/2020     o Protonix 40 mg oral tablet,delayed release (DR/EC)   SIG: take 1 tablet (40 mg) by oral route once daily for 90 days   DISP: (90) tablets with 1 refills  Adjusted on 07/02/2020     o Ventolin HFA 90 mcg/actuation inhalation HFA aerosol inhaler   SIG: inhale 1 puff (90 mcg) by inhalation route every 6 hours as needed for 90 days   DISP: (3) 8 gm canister with 1 refills  Adjusted on 07/02/2020     o Medications have been Reconciled  o Transition of Care or Provider Policy  · Instructions  o Plan Of Care:   o Take all medications as prescribed/directed.  o Call the office with any concerns or questions.  o Patient discharge summary has been reviewed and placed in patient's electronic medical record.  o Patient received a phone call from my office within 2 business days of discharge from inpatient status, and documented within the patient's chart.  o Also patient was seen (face to face) for follow up evaluation within 14 calendar days of discharge from inpatient status for a severe complexity issue.  o Patient was educated on their diagnosis, treatment and any medication changes while being evaluated today.  · Disposition  o Call or Return if symptoms worsen or persist.  o follow up in 6 months  o follow up as needed  o call the office with any questions or concerns            Electronically Signed by: Cassidy Guan APRN -Author on July 2, 2020 04:08:09 PM

## 2021-05-13 NOTE — PROGRESS NOTES
Progress Note      Patient Name: Manuel Subramanian   Patient ID: [id number]   Sex: Male   YOB: 1960    Primary Care Provider: Cassidy MATHIAS   Referring Provider: Cassidy MATHIAS    Visit Date: August 13, 2020    Provider: Zain Velasquez PA-C   Location: [facility]   Location Address: [address] [place] [address]  [id number]   Location Phone: [phone]          Chief Complaint  · Right knee pain      History Of Present Illness  Manuel Subramanian is a 60 year old /Black male who presents today to [facility]. Patient presents for follow-up evaluation of right total knee arthroplasty, 6/24/2020. Patient states he still has some pain and stiffness in the knee he has home health physical therapy coming to his home several times a week. Patient states he is better at straightening his knee than he has bending. Patient has been doing home exercises after therapy leaves. Patient states he only occasionally takes pain medication.       Past Medical History  Alcohol abuse; Allergies; Anxiety; Arthritis; Asthma; Bipolar Disorder; Cataract; COPD; Depression; Diabetes; Essential hypertension; Fatty liver; High cholesterol; Hyperlipemia; Hyperlipidemia; Hypertension; Hypertension, essential; Kidney Disease; Limb Swelling; Migraine; Psychiatric Care; Skin Disease; Sleep apnea, unspecified; Thyroid disease; Thyroid disorder         Past Surgical History  Colonoscopy; Tonsillectomy         Medication List  albuterol sulfate 2.5 mg /3 mL (0.083 %) inhalation solution for nebulization; aspirin 325 mg oral tablet,delayed release (DR/EC); Aspirin Low Dose 81 mg oral tablet,delayed release (DR/EC); atorvastatin 20 mg oral tablet; Eliquis 2.5 mg oral tablet; ferrous sulfate 325 mg (65 mg iron) oral tablet; fexofenadine 180 mg oral tablet; fluoxetine 20 mg oral capsule; hydrochlorothiazide 12.5 mg oral capsule; Janumet 50-1,000 mg oral tablet; lidocaine 5 % topical adhesive  "patch,medicated; Linzess 145 mcg oral capsule; lisinopril 40 mg oral tablet; lithium carbonate 450 mg oral tablet extended release; melatonin 5 mg oral capsule; metoprolol succinate 100 mg oral tablet extended release 24 hr; montelukast 10 mg oral tablet; oxycodone-acetaminophen 7.5-325 mg oral tablet; Percocet 7.5-325 mg oral tablet; Protonix 40 mg oral tablet,delayed release (DR/EC); Seroquel  mg oral tablet extended release 24 hr; Synthroid 75 mcg oral tablet; Ventolin HFA 90 mcg/actuation inhalation HFA aerosol inhaler         Allergy List  NO KNOWN DRUG ALLERGIES       Allergies Reconciled  Family Medical History  Heart Disease; Diabetes, unspecified type; Hypertension; No family history of malignant neoplasm; No family history of colorectal cancer; Family history of Arthritis         Social History  Alcohol (Former); Alcohol Use (Never); Disabled; lives with spouse; .; Recreational Drug Use (Never); Retired; Retired.; Tobacco (Former)         Immunizations  Name Date Admin   Influenza    Influenza          Review of Systems  · Constitutional  o Denies  o : fever, chills, weight loss  · Cardiovascular  o Denies  o : chest pain, shortness of breath  · Gastrointestinal  o Denies  o : liver disease, heartburn, nausea, blood in stools  · Genitourinary  o Denies  o : painful urination, blood in urine  · Integument  o Denies  o : rash, itching  · Neurologic  o Denies  o : headache, weakness, loss of consciousness  · Musculoskeletal  o Denies  o : painful, swollen joints  · Psychiatric  o Denies  o : drug/alcohol addiction, anxiety, depression      Vitals  Date Time BP Position Site L\R Cuff Size HR RR TEMP (F) WT  HT  BMI kg/m2 BSA m2 O2 Sat        08/13/2020 02:09 PM      71 - R   314lbs 8oz 5'  11\" 43.86 2.67 96 %          Physical Examination  · Constitutional  o Appearance  o : well developed, well-nourished, no obvious deformities present  · Head and Face  o Head  o :   § Inspection  § : " normocephalic  o Face  o :   § Inspection  § : no facial lesions  · Eyes  o Conjunctivae  o : conjunctivae normal  o Sclerae  o : sclerae white  · Ears, Nose, Mouth and Throat  o Ears  o :   § External Ears  § : appearance within normal limits  § Hearing  § : intact  o Nose  o :   § External Nose  § : appearance normal  · Neck  o Inspection/Palpation  o : normal appearance  o Range of Motion  o : full range of motion  · Respiratory  o Respiratory Effort  o : breathing unlabored  o Inspection of Chest  o : normal appearance  o Auscultation of Lungs  o : no audible wheezing or rales  · Cardiovascular  o Heart  o : regular rate  · Gastrointestinal  o Abdominal Examination  o : soft and non-tender  · Skin and Subcutaneous Tissue  o General Inspection  o : intact, no rashes  · Psychiatric  o General  o : Alert and oriented x3  o Judgement and Insight  o : judgment and insight intact  o Mood and Affect  o : mood normal, affect appropriate  · Right Knee  o Inspection  o : Incision is well-healed, no erythema, no ecchymosis, no swelling, no signs of infection. Full extension, flexion 95, stable anterior/posterior drawer, stable to varus/valgus stress          Assessment  · Aftercare following surgery of right total knee arthroplasty, 6/24/2020     V54.81  · Right knee pain, unspecified chronicity     719.46/M25.561  · Arthrofibrosis of right total knee arthroplasty, initial encounter     996.47/T84.82XA      Plan  · Medications  o Medications have been Reconciled  o Transition of Care or Provider Policy  · Instructions  o Dr. Carrasco saw and examined the patient and agrees with plan.   o Reviewed the patient's Past Medical, Social, and Family history as well as the ROS at today's visit, no changes.  o Call or return if worsening symptoms.  o Discussed surgery.  o Risks/benefits discussed with patient including, but not limited to: infection, bleeding, neurovascular damage, malunion, nonunion, aesthetic deformity, need  for further surgery, and death.  o Surgery pamphlet given.  o Diagnosis and treatment plan were discussed with the patient, Dr. Carrasco also met with the patient, discussed need for right knee manipulation, risks, benefits, procedure and recovery were discussed with the patient, patient and spouse agree to have right knee manipulation. Follow-up 2 weeks postop.  o Electronically Identified Patient Education Materials Provided Electronically            Electronically Signed by: JJ Perez-MARCI -Author on August 13, 2020 03:15:59 PM  Electronically Co-signed by: Jose Guadalupe Carrasco MD -Reviewer on August 13, 2020 09:09:04 PM

## 2021-05-13 NOTE — PROGRESS NOTES
Progress Note      Patient Name: Manuel Subramanian   Patient ID: [id number]   Sex: Male   YOB: 1960    Primary Care Provider: Cassidy MATHIAS   Referring Provider: Cassidy MATHIAS    Visit Date: July 9, 2020    Provider: Jose Guadalupe Carrasco MD   Location: [facility]   Location Address: [address] [place] [address]  [id number]   Location Phone: [phone]          Chief Complaint  · Right knee pain      History Of Present Illness  Manuel Subramanian is a 59 year old /Black male who presents today to [facility].      The patient presents today for follow up with right total knee arthroplasty. He is doing very good in PT. He is doing good but reports tightness after PT. He is in pain today and presents today with a walker.  He has been taking Percocet at home every 6 hours as needed.            Past Medical History  Alcohol abuse; Allergies; Anxiety; Arthritis; Asthma; Bipolar Disorder; Cataract; COPD; Depression; Diabetes; Essential hypertension; Fatty liver; High cholesterol; Hyperlipemia; Hyperlipidemia; Hypertension; Hypertension, essential; Kidney Disease; Limb Swelling; Migraine; Psychiatric Care; Skin Disease; Sleep apnea, unspecified; Thyroid disease; Thyroid disorder         Past Surgical History  Colonoscopy; Tonsillectomy         Medication List  albuterol sulfate 2.5 mg /3 mL (0.083 %) inhalation solution for nebulization; aspirin 325 mg oral tablet,delayed release (DR/EC); Aspirin Low Dose 81 mg oral tablet,delayed release (DR/EC); atorvastatin 20 mg oral tablet; Eliquis 2.5 mg oral tablet; ferrous sulfate 325 mg (65 mg iron) oral tablet; fexofenadine 180 mg oral tablet; fluoxetine 20 mg oral capsule; hydrochlorothiazide 12.5 mg oral capsule; Janumet 50-1,000 mg oral tablet; lidocaine 5 % topical adhesive patch,medicated; Linzess 145 mcg oral capsule; lisinopril 40 mg oral tablet; lithium carbonate 450 mg oral tablet extended release; melatonin 5  "mg oral capsule; metoprolol succinate 100 mg oral tablet extended release 24 hr; montelukast 10 mg oral tablet; oxycodone-acetaminophen 7.5-325 mg oral tablet; Percocet 7.5-325 mg oral tablet; Protonix 40 mg oral tablet,delayed release (DR/EC); Seroquel  mg oral tablet extended release 24 hr; Synthroid 75 mcg oral tablet; Ventolin HFA 90 mcg/actuation inhalation HFA aerosol inhaler         Allergy List  NO KNOWN DRUG ALLERGIES         Family Medical History  Heart Disease; Diabetes, unspecified type; Hypertension; No family history of malignant neoplasm; No family history of colorectal cancer; Family history of Arthritis         Social History  Alcohol (Former); Alcohol Use (Never); Disabled; lives with spouse; .; Recreational Drug Use (Never); Retired; Retired.; Tobacco (Former)         Immunizations  Name Date Admin   Influenza    Influenza          Review of Systems  · Constitutional  o Denies  o : fever, chills, weight loss  · Cardiovascular  o Denies  o : chest pain, shortness of breath  · Gastrointestinal  o Denies  o : liver disease, heartburn, nausea, blood in stools  · Genitourinary  o Denies  o : painful urination, blood in urine  · Integument  o Denies  o : rash, itching  · Neurologic  o Denies  o : headache, weakness, loss of consciousness  · Musculoskeletal  o Denies  o : painful, swollen joints  · Psychiatric  o Denies  o : drug/alcohol addiction, anxiety, depression      Vitals  Date Time BP Position Site L\R Cuff Size HR RR TEMP (F) WT  HT  BMI kg/m2 BSA m2 O2 Sat        07/09/2020 10:06 AM         310lbs 0oz 5'  11\" 43.24 2.65           Physical Examination  · Constitutional  o Appearance  o : well developed, well-nourished, no obvious deformities present  · Head and Face  o Head  o :   § Inspection  § : normocephalic  o Face  o :   § Inspection  § : no facial lesions  · Eyes  o Conjunctivae  o : conjunctivae normal  o Sclerae  o : sclerae white  · Ears, Nose, Mouth and " Throat  o Ears  o :   § External Ears  § : appearance within normal limits  § Hearing  § : intact  o Nose  o :   § External Nose  § : appearance normal  · Neck  o Inspection/Palpation  o : normal appearance  o Range of Motion  o : full range of motion  · Respiratory  o Respiratory Effort  o : breathing unlabored  o Inspection of Chest  o : normal appearance  o Auscultation of Lungs  o : no audible wheezing or rales  · Cardiovascular  o Heart  o : regular rate  · Gastrointestinal  o Abdominal Examination  o : soft and non-tender  · Skin and Subcutaneous Tissue  o General Inspection  o : intact, no rashes  · Psychiatric  o General  o : Alert and oriented x3  o Judgement and Insight  o : judgment and insight intact  o Mood and Affect  o : mood normal, affect appropriate  · Right Knee  o Inspection  o : Neurovascularly intact, Goes straight. F: 90, Pain with ROM. Knee is stable to stresses. Incision is well healing, no signs of infection.   · In Office Procedures  o View  o : AP/LATERAL/SUNRISE  o Site  o : right, knee  o Indication  o : right knee pain  o Study  o : X-rays ordered, taken in the office, and reviewed today.  o Xray  o : X-Ray: Intact Knee replacement, good alignment,  o Comparative Data  o : No comparative data found              Assessment  · Aftercare following right total knee arthroplasty     V54.81  · Right knee pain, unspecified chronicity     719.46/M25.561      Plan  · Orders  o Knee (Right) [facility] Preferred View ([id number]) - 719.46/M25.561 - 07/09/2020  · Medications  o Medications have been Reconciled  o Transition of Care or Provider Policy  · Instructions  o Dr. Carrasco saw and examined the patient and agrees with plan.   o Reviewed the patient's Past Medical, Social, and Family history as well as the ROS at today's visit, no changes.  o Call or return if worsening symptoms.  o The above service was scribed by Ernesto Hair on my behalf and I attest to the accuracy of the note. jsb  o We  discussed the plan with the patient. Plan to continue PT. He is doing at home Pt. Follow up with us in one month for re-evaluation. Advised him to wait one more day for a shower. He has a cane for going up the stairs. Advised him to work hard in PT and to call us with any concerns or questions.   o Electronically Identified Patient Education Materials Provided Electronically            Electronically Signed by: Oneil Hair - , Other -Author on July 15, 2020 10:51:38 AM  Electronically Co-signed by: Jose Guadalupe Carrasco MD -Reviewer on July 15, 2020 10:18:07 PM

## 2021-05-13 NOTE — PROGRESS NOTES
Progress Note      Patient Name: Manuel Subramanian   Patient ID: [id number]   Sex: Male   YOB: 1960    Primary Care Provider: Cassidy MATHIAS   Referring Provider: Cassidy MATHIAS    Visit Date: August 31, 2020    Provider: Zain Velasquez PA-C   Location: [facility]   Location Address: [address] [place] [address]  [id number]   Location Phone: [phone]          Chief Complaint  · Right knee replacement      History Of Present Illness  Manuel Subramanian is a 60 year old /Black male who presents today to [facility]. Presents for follow-up evaluation of right total knee arthroplasty, 6/24/2020 and right knee manipulation on 8/17/2020. Patient states after he was discharged from the hospital he was unable to schedule physical therapy as he was directed he states his therapist office was unable to fit him in until an evaluation he had last week and he has his first appointment today. Patient states he was given home exercises which he states he has been working on diligently since his manipulation. Patient admits to achy pain and stiffness in the knee after sitting for long., Patient states he feels like he is walking better and moving the knee better.       Past Medical History  Alcohol abuse; Allergies; Anxiety; Arthritis; Asthma; Bipolar Disorder; Cataract; COPD; Depression; Diabetes; Essential hypertension; Fatty liver; High cholesterol; Hyperlipemia; Hyperlipidemia; Hypertension; Hypertension, essential; Kidney Disease; Limb Swelling; Migraine; Psychiatric Care; Skin Disease; Sleep apnea, unspecified; Thyroid disease; Thyroid disorder         Past Surgical History  Colonoscopy; Tonsillectomy         Medication List  albuterol sulfate 2.5 mg /3 mL (0.083 %) inhalation solution for nebulization; aspirin 325 mg oral tablet,delayed release (DR/EC); Aspirin Low Dose 81 mg oral tablet,delayed release (DR/EC); atorvastatin 20 mg oral tablet; Eliquis 2.5 mg  oral tablet; ferrous sulfate 325 mg (65 mg iron) oral tablet; fexofenadine 180 mg oral tablet; fluoxetine 20 mg oral capsule; hydrochlorothiazide 12.5 mg oral capsule; Janumet 50-1,000 mg oral tablet; lidocaine 5 % topical adhesive patch,medicated; Linzess 145 mcg oral capsule; lisinopril 40 mg oral tablet; lithium carbonate 450 mg oral tablet extended release; melatonin 5 mg oral capsule; metoprolol succinate 100 mg oral tablet extended release 24 hr; Miralax 17 gram oral powder in packet; montelukast 10 mg oral tablet; oxycodone-acetaminophen 7.5-325 mg oral tablet; Percocet 7.5-325 mg oral tablet; Protonix 40 mg oral tablet,delayed release (DR/EC); Seroquel  mg oral tablet extended release 24 hr; Synthroid 75 mcg oral tablet; Ventolin HFA 90 mcg/actuation inhalation HFA aerosol inhaler         Allergy List  NO KNOWN DRUG ALLERGIES       Allergies Reconciled  Family Medical History  Heart Disease; Diabetes, unspecified type; Hypertension; No family history of malignant neoplasm; No family history of colorectal cancer; Family history of Arthritis         Social History  Alcohol (Former); Alcohol Use (Never); Disabled; lives with spouse; .; Recreational Drug Use (Never); Retired; Retired.; Tobacco (Former)         Immunizations  Name Date Admin   Influenza    Influenza          Review of Systems  · Constitutional  o Denies  o : fever, chills, weight loss  · Cardiovascular  o Denies  o : chest pain, shortness of breath  · Gastrointestinal  o Denies  o : liver disease, heartburn, nausea, blood in stools  · Genitourinary  o Denies  o : painful urination, blood in urine  · Integument  o Denies  o : rash, itching  · Neurologic  o Denies  o : headache, weakness, loss of consciousness  · Musculoskeletal  o Denies  o : painful, swollen joints  · Psychiatric  o Denies  o : drug/alcohol addiction, anxiety, depression      Vitals  Date Time BP Position Site L\R Cuff Size HR RR TEMP (F) WT  HT  BMI kg/m2 BSA m2 O2  "[facility]       08/31/2020 10:35 AM      100 - R   322lbs 4oz 5'  11\" 44.94 2.71 92 %          Physical Examination  · Constitutional  o Appearance  o : well developed, well-nourished, no obvious deformities present  · Head and Face  o Head  o :   § Inspection  § : normocephalic  o Face  o :   § Inspection  § : no facial lesions  · Eyes  o Conjunctivae  o : conjunctivae normal  o Sclerae  o : sclerae white  · Ears, Nose, Mouth and Throat  o Ears  o :   § External Ears  § : appearance within normal limits  § Hearing  § : intact  o Nose  o :   § External Nose  § : appearance normal  · Neck  o Inspection/Palpation  o : normal appearance  o Range of Motion  o : full range of motion  · Respiratory  o Respiratory Effort  o : breathing unlabored  o Inspection of Chest  o : normal appearance  o Auscultation of Lungs  o : no audible wheezing or rales  · Cardiovascular  o Heart  o : regular rate  · Gastrointestinal  o Abdominal Examination  o : soft and non-tender  · Skin and Subcutaneous Tissue  o General Inspection  o : intact, no rashes  · Psychiatric  o General  o : Alert and oriented x3  o Judgement and Insight  o : judgment and insight intact  o Mood and Affect  o : mood normal, affect appropriate  · Right Knee  o Inspection  o : Incision is well-healed, no erythema, no ecchymosis, no swelling, no signs of infection. Full extension, flexion 110, stable anterior/posterior drawer, stable to varus/valgus stress. Mild pain with range of motion.          Assessment  · Aftercare following surgery of right knee manipulation, 8/17/2020     V54.81  · Aftercare;following right total knee arthroplasty, 6/24/20     V54.81/Z47.1  · Right knee pain, unspecified chronicity     719.46/M25.561      Plan  · Medications  o Medications have been Reconciled  o Transition of Care or Provider Policy  · Instructions  o Reviewed the patient's Past Medical, Social, and Family history as well as the ROS at today's visit, no changes.  o Call or " return if worsening symptoms.  o Follow Up in 4 weeks.   o Advised patient to work on aggressive range of motion with his physical therapist and new order was written, continue weightbearing and activity as tolerated. Follow-up in 4 weeks with x-rays.            Electronically Signed by: JJ Perez-C -Author on August 31, 2020 10:58:29 AM  Electronically Co-signed by: Jose Guadalupe Carrasco MD -Reviewer on August 31, 2020 01:14:39 PM

## 2021-05-13 NOTE — PROGRESS NOTES
Progress Note      Patient Name: Manuel Subramanian   Patient ID: [id number]   Sex: Male   YOB: 1960    Primary Care Provider: Cassidy MATHIAS   Referring Provider: Cassidy MATHIAS    Visit Date: October 1, 2020    Provider: Zain Velasquez PA-C   Location: [facility]   Location Address: [address] [place] [address]  [id number]   Location Phone: [phone]          Chief Complaint  · Right TKA      History Of Present Illness  Manuel Subramanian is a 60 year old /Black male who presents today to [facility]. Patient presents for follow-up evaluation of right total knee arthroplasty, 6/24/2020 and right knee manipulation on 8/17/2020. Patient states he has continued to improve with strength and range of motion of the knee, he finishes pain medication, takes Tylenol as needed. Patient uses a cane to ambulate due to left knee pain. Otherwise patient states right knee is doing well. Patient states in 2 weeks he is moving to [facility] [place], he is asking for information on when and how to follow-up in [place] for further evaluation of his right knee..       Past Medical History  Alcohol abuse; Allergies; Anxiety; Arthritis; Asthma; Bipolar Disorder; Cataract; COPD; Depression; Diabetes; Essential hypertension; Fatty liver; High cholesterol; Hyperlipemia; Hyperlipidemia; Hypertension; Hypertension, essential; Kidney Disease; Limb Swelling; Migraine; Psychiatric Care; Skin Disease; Sleep apnea, unspecified; Thyroid disease; Thyroid disorder         Past Surgical History  Colonoscopy; Tonsillectomy         Medication List  albuterol sulfate 2.5 mg /3 mL (0.083 %) inhalation solution for nebulization; aspirin 325 mg oral tablet,delayed release (DR/EC); Aspirin Low Dose 81 mg oral tablet,delayed release (DR/EC); atorvastatin 20 mg oral tablet; Eliquis 2.5 mg oral tablet; ferrous sulfate 325 mg (65 mg iron) oral tablet; fexofenadine 180 mg oral tablet; fluoxetine 20 mg  oral capsule; hydrochlorothiazide 12.5 mg oral capsule; Janumet 50-1,000 mg oral tablet; lidocaine 5 % topical adhesive patch,medicated; Linzess 145 mcg oral capsule; lisinopril 40 mg oral tablet; lithium carbonate 450 mg oral tablet extended release; melatonin 3 mg oral tablet; metoprolol succinate 100 mg oral tablet extended release 24 hr; Miralax 17 gram oral powder in packet; montelukast 10 mg oral tablet; oxycodone-acetaminophen 7.5-325 mg oral tablet; Percocet 7.5-325 mg oral tablet; ProAir HFA 90 mcg/actuation inhalation HFA aerosol inhaler; Protonix 40 mg oral tablet,delayed release (DR/EC); Seroquel  mg oral tablet extended release 24 hr; Synthroid 75 mcg oral tablet; Ventolin HFA 90 mcg/actuation inhalation HFA aerosol inhaler         Allergy List  NO KNOWN DRUG ALLERGIES       Allergies Reconciled  Family Medical History  Heart Disease; Diabetes, unspecified type; Hypertension; No family history of malignant neoplasm; No family history of colorectal cancer; Family history of Arthritis         Social History  Alcohol (Former); Alcohol Use (Never); Disabled; lives with spouse; .; Recreational Drug Use (Never); Retired; Retired.; Tobacco (Former)         Immunizations  Name Date Admin   Influenza 10/02/2019   Influenza 02/01/2019         Review of Systems  · Constitutional  o Denies  o : fever, chills, weight loss  · Cardiovascular  o Denies  o : chest pain, shortness of breath  · Gastrointestinal  o Denies  o : liver disease, heartburn, nausea, blood in stools  · Genitourinary  o Denies  o : painful urination, blood in urine  · Integument  o Denies  o : rash, itching  · Neurologic  o Denies  o : headache, weakness, loss of consciousness  · Musculoskeletal  o Denies  o : painful, swollen joints  · Psychiatric  o Denies  o : drug/alcohol addiction, anxiety, depression      Vitals  Date Time BP Position Site L\R Cuff Size HR RR TEMP (F) WT  HT  BMI kg/m2 BSA m2 O2 Sat FR L/min FiO2 HC      "  10/01/2020 09:54 AM      60 - R   322lbs 4oz 5'  11\" 44.94 2.71 95 %            Physical Examination  · Constitutional  o Appearance  o : well developed, well-nourished, no obvious deformities present  · Head and Face  o Head  o :   § Inspection  § : normocephalic  o Face  o :   § Inspection  § : no facial lesions  · Eyes  o Conjunctivae  o : conjunctivae normal  o Sclerae  o : sclerae white  · Ears, Nose, Mouth and Throat  o Ears  o :   § External Ears  § : appearance within normal limits  § Hearing  § : intact  o Nose  o :   § External Nose  § : appearance normal  · Neck  o Inspection/Palpation  o : normal appearance  o Range of Motion  o : full range of motion  · Respiratory  o Respiratory Effort  o : breathing unlabored  o Inspection of Chest  o : normal appearance  o Auscultation of Lungs  o : no audible wheezing or rales  · Cardiovascular  o Heart  o : regular rate  · Gastrointestinal  o Abdominal Examination  o : soft and non-tender  · Skin and Subcutaneous Tissue  o General Inspection  o : intact, no rashes  · Psychiatric  o General  o : Alert and oriented x3  o Judgement and Insight  o : judgment and insight intact  o Mood and Affect  o : mood normal, affect appropriate  · Right Knee  o Inspection  o : Incision is well-healed, no erythema, no ecchymosis, no swelling, no effusion, no signs of infection. Full extension, flexion 110, stable anterior/posterior drawer, stable to varus/valgus stress.  · In Office Procedures  o View  o : AP/LATERAL/SUNRISE  o Site  o : right, knee  o Indication  o : Right knee pain  o Study  o : X-rays ordered, taken in the office, and reviewed today.  o Xray  o : Intact-appearing right knee replacement; No evidence of subsidence, loosening, or periprosthetic fracture; Normal alignment.  o Comparative Data  o : No comparative data found          Assessment  · Aftercare following surgery of right total knee arthroplasty, 6/24/2020 and right knee manipulation, " 8/17/2020     V54.81  · Right knee pain, unspecified chronicity     719.46/M25.561      Plan  · Orders  o Knee (Right) [facility] ([id number]) - 719.46/M25.561 - 10/01/2020  · Medications  o Medications have been Reconciled  o Transition of Care or Provider Policy  · Instructions  o Reviewed the patient's Past Medical, Social, and Family history as well as the ROS at today's visit, no changes.  o Call or return if worsening symptoms.  o Follow up in 3 months.  o Reviewed x-rays with the patient, advised him to continue physical therapy until he moves and to continue home exercises once he establishes in [place]. Patient was advised to establish care with a family physician as well as an orthopedic office in [place] when he gets there. His next follow-up visit in 3 months with x-rays and patient was advised to follow-up with a new orthopedic physician in January/3 months.            Electronically Signed by: JJ Perez-C -Author on October 1, 2020 10:45:06 AM  Electronically Co-signed by: Jose Guadalupe Carrasco MD -Reviewer on October 1, 2020 09:11:27 PM

## 2021-05-14 VITALS — BODY MASS INDEX: 44.1 KG/M2 | HEIGHT: 71 IN | HEART RATE: 100 BPM | OXYGEN SATURATION: 92 % | WEIGHT: 315 LBS

## 2021-05-14 VITALS — WEIGHT: 315 LBS | HEIGHT: 71 IN | OXYGEN SATURATION: 95 % | HEART RATE: 60 BPM | BODY MASS INDEX: 44.1 KG/M2

## 2021-05-15 VITALS
HEART RATE: 61 BPM | HEIGHT: 71 IN | OXYGEN SATURATION: 95 % | SYSTOLIC BLOOD PRESSURE: 142 MMHG | DIASTOLIC BLOOD PRESSURE: 88 MMHG | WEIGHT: 315 LBS | BODY MASS INDEX: 44.1 KG/M2 | TEMPERATURE: 98.3 F

## 2021-05-15 VITALS
BODY MASS INDEX: 44.1 KG/M2 | WEIGHT: 315 LBS | HEART RATE: 79 BPM | TEMPERATURE: 98.8 F | DIASTOLIC BLOOD PRESSURE: 82 MMHG | HEIGHT: 71 IN | OXYGEN SATURATION: 91 % | SYSTOLIC BLOOD PRESSURE: 134 MMHG

## 2021-05-15 VITALS — BODY MASS INDEX: 44.03 KG/M2 | HEIGHT: 71 IN | HEART RATE: 71 BPM | WEIGHT: 314.5 LBS | OXYGEN SATURATION: 96 %

## 2021-05-15 VITALS
BODY MASS INDEX: 44.1 KG/M2 | WEIGHT: 315 LBS | HEIGHT: 71 IN | HEART RATE: 75 BPM | DIASTOLIC BLOOD PRESSURE: 61 MMHG | SYSTOLIC BLOOD PRESSURE: 109 MMHG

## 2021-05-15 VITALS
BODY MASS INDEX: 44.1 KG/M2 | OXYGEN SATURATION: 95 % | HEIGHT: 71 IN | HEART RATE: 75 BPM | TEMPERATURE: 98.6 F | WEIGHT: 315 LBS | DIASTOLIC BLOOD PRESSURE: 88 MMHG | SYSTOLIC BLOOD PRESSURE: 156 MMHG

## 2021-05-15 VITALS
HEIGHT: 61 IN | TEMPERATURE: 98.8 F | WEIGHT: 315 LBS | DIASTOLIC BLOOD PRESSURE: 80 MMHG | HEIGHT: 71 IN | HEART RATE: 69 BPM | BODY MASS INDEX: 44.1 KG/M2 | OXYGEN SATURATION: 94 % | BODY MASS INDEX: 59.47 KG/M2 | HEART RATE: 69 BPM | SYSTOLIC BLOOD PRESSURE: 150 MMHG | WEIGHT: 315 LBS | SYSTOLIC BLOOD PRESSURE: 150 MMHG | TEMPERATURE: 98.8 F | DIASTOLIC BLOOD PRESSURE: 80 MMHG | OXYGEN SATURATION: 94 %

## 2021-05-15 VITALS — BODY MASS INDEX: 43.4 KG/M2 | WEIGHT: 310 LBS | HEIGHT: 71 IN

## 2021-05-15 VITALS — BODY MASS INDEX: 44.1 KG/M2 | WEIGHT: 315 LBS | OXYGEN SATURATION: 95 % | HEIGHT: 71 IN | HEART RATE: 74 BPM

## 2021-05-15 VITALS
DIASTOLIC BLOOD PRESSURE: 78 MMHG | WEIGHT: 315 LBS | BODY MASS INDEX: 44.1 KG/M2 | HEART RATE: 72 BPM | OXYGEN SATURATION: 96 % | TEMPERATURE: 98.6 F | SYSTOLIC BLOOD PRESSURE: 136 MMHG | HEIGHT: 71 IN

## 2021-05-16 VITALS
WEIGHT: 305.5 LBS | HEART RATE: 66 BPM | SYSTOLIC BLOOD PRESSURE: 116 MMHG | BODY MASS INDEX: 42.77 KG/M2 | HEIGHT: 71 IN | OXYGEN SATURATION: 94 % | TEMPERATURE: 98 F | DIASTOLIC BLOOD PRESSURE: 58 MMHG